# Patient Record
Sex: FEMALE | URBAN - METROPOLITAN AREA
[De-identification: names, ages, dates, MRNs, and addresses within clinical notes are randomized per-mention and may not be internally consistent; named-entity substitution may affect disease eponyms.]

---

## 2024-07-31 ENCOUNTER — APPOINTMENT (RX ONLY)
Dept: URBAN - METROPOLITAN AREA CLINIC 151 | Facility: CLINIC | Age: 59
Setting detail: DERMATOLOGY
End: 2024-07-31

## 2024-07-31 DIAGNOSIS — L60.1 ONYCHOLYSIS: ICD-10-CM

## 2024-07-31 DIAGNOSIS — B02.29 OTHER POSTHERPETIC NERVOUS SYSTEM INVOLVEMENT: ICD-10-CM

## 2024-07-31 PROBLEM — D23.71 OTHER BENIGN NEOPLASM OF SKIN OF RIGHT LOWER LIMB, INCLUDING HIP: Status: ACTIVE | Noted: 2024-07-31

## 2024-07-31 PROCEDURE — ? PRESCRIPTION

## 2024-07-31 PROCEDURE — 99203 OFFICE O/P NEW LOW 30 MIN: CPT

## 2024-07-31 PROCEDURE — ? COUNSELING

## 2024-07-31 PROCEDURE — ? PRESCRIPTION MEDICATION MANAGEMENT

## 2024-07-31 PROCEDURE — ? DIAGNOSIS COMMENT

## 2024-07-31 PROCEDURE — ? OBSERVATION AND MEASURE

## 2024-07-31 RX ORDER — CAPSAICIN 0.25 MG/G
CREAM TOPICAL TID
Qty: 60 | Refills: 3 | Status: ERX | COMMUNITY
Start: 2024-07-31

## 2024-07-31 RX ADMIN — CAPSAICIN: 0.25 CREAM TOPICAL at 00:00

## 2024-07-31 ASSESSMENT — LOCATION DETAILED DESCRIPTION DERM
LOCATION DETAILED: LEFT GREAT TOENAIL
LOCATION DETAILED: RIGHT SUPERIOR UPPER BACK

## 2024-07-31 ASSESSMENT — LOCATION ZONE DERM
LOCATION ZONE: TRUNK
LOCATION ZONE: TOENAIL

## 2024-07-31 ASSESSMENT — LOCATION SIMPLE DESCRIPTION DERM
LOCATION SIMPLE: LEFT GREAT TOE
LOCATION SIMPLE: RIGHT UPPER BACK

## 2024-07-31 NOTE — PROCEDURE: OBSERVATION
Detail Level: Detailed
Size Of Lesion: 10x8 mm
Morphology Per Location (Optional): Hyperpigmented firm plaque

## 2024-07-31 NOTE — HPI: OTHER
Condition:: Spot check
Please Describe Your Condition:: Pt reports a spot on her right leg that has been there for 10 years and has gotten bigger and itches. She mentions it also has gotten darker. Pt mentions dark spots on her face that have been increasing over the years and have gotten thicker. Pt reports a Hx of fungal infections on her toe nails. She has treated this with a dropper, she also mentions a spot in her left axilla. She reports a Hx of shingles 4 years ago on her back and it keeps itching.

## 2024-07-31 NOTE — PROCEDURE: PRESCRIPTION MEDICATION MANAGEMENT
Render In Strict Bullet Format?: Yes
Detail Level: Zone
Initiate Treatment: Capsaicin 0.025% cream TID

## 2024-07-31 NOTE — PROCEDURE: DIAGNOSIS COMMENT
Comment: Chronic itching after herpes zoster episode 4 years ago per pt. Capsaicin 0.025% cream TID
Detail Level: Zone
Render Risk Assessment In Note?: yes
Comment: ? Fungus vs trauma from footwear.